# Patient Record
Sex: FEMALE | URBAN - METROPOLITAN AREA
[De-identification: names, ages, dates, MRNs, and addresses within clinical notes are randomized per-mention and may not be internally consistent; named-entity substitution may affect disease eponyms.]

---

## 2021-02-08 ENCOUNTER — OFFICE VISIT (OUTPATIENT)
Dept: URBAN - METROPOLITAN AREA CLINIC 29 | Facility: CLINIC | Age: 26
End: 2021-02-08
Payer: COMMERCIAL

## 2021-02-08 DIAGNOSIS — H00.11 CHALAZION RIGHT UPPER EYELID: Primary | ICD-10-CM

## 2021-02-08 DIAGNOSIS — H00.14 CHALAZION LEFT UPPER EYELID: ICD-10-CM

## 2021-02-08 PROCEDURE — 99202 OFFICE O/P NEW SF 15 MIN: CPT | Performed by: OPTOMETRIST

## 2021-02-08 ASSESSMENT — INTRAOCULAR PRESSURE
OS: 14
OD: 14

## 2021-02-08 NOTE — IMPRESSION/PLAN
Impression: Chalazion right upper eyelid: H00.11. Plan: Discussed diagnosis in detail with patient. Discussed treatment options with patient. Lid scrubs and hygiene were explained. Patient instructed to use artificial tears as needed. Discussed with patient if worsens to contact office & consult with Dr. Janet Nixon.